# Patient Record
Sex: MALE | Race: BLACK OR AFRICAN AMERICAN | NOT HISPANIC OR LATINO | Employment: STUDENT | URBAN - METROPOLITAN AREA
[De-identification: names, ages, dates, MRNs, and addresses within clinical notes are randomized per-mention and may not be internally consistent; named-entity substitution may affect disease eponyms.]

---

## 2018-02-15 ENCOUNTER — HOSPITAL ENCOUNTER (EMERGENCY)
Facility: OTHER | Age: 18
Discharge: HOME | End: 2018-02-15
Attending: EMERGENCY MEDICINE
Payer: MEDICAID

## 2018-02-15 VITALS
SYSTOLIC BLOOD PRESSURE: 117 MMHG | HEART RATE: 66 BPM | OXYGEN SATURATION: 100 % | BODY MASS INDEX: 21.53 KG/M2 | HEIGHT: 66 IN | RESPIRATION RATE: 18 BRPM | TEMPERATURE: 98 F | WEIGHT: 134 LBS | DIASTOLIC BLOOD PRESSURE: 69 MMHG

## 2018-02-15 DIAGNOSIS — K64.9 HEMORRHOIDS, UNSPECIFIED HEMORRHOID TYPE: Primary | ICD-10-CM

## 2018-02-15 PROCEDURE — 99283 EMERGENCY DEPT VISIT LOW MDM: CPT

## 2018-02-15 RX ORDER — DOCUSATE SODIUM 100 MG/1
100 CAPSULE, LIQUID FILLED ORAL 2 TIMES DAILY
Qty: 60 CAPSULE | Refills: 0 | Status: SHIPPED | OUTPATIENT
Start: 2018-02-15

## 2018-02-15 RX ORDER — HYDROCORTISONE 1 %
CREAM (GRAM) TOPICAL
Qty: 30 G | Refills: 0 | Status: SHIPPED | OUTPATIENT
Start: 2018-02-15 | End: 2018-02-25

## 2018-02-15 NOTE — ED PROVIDER NOTES
"Encounter Date: 2/15/2018       History     Chief Complaint   Patient presents with    Hemorrhoids     has been having hemorrhoids for 6 months and they are bleeding and is stating that he also had a headache     Patient is a 17 y.o. male presenting to the emergency department with complaints of hemorrhoids.  He states that he's had this problem for "many years", but has not seen anyone for it.  He states that he has rectal bleeding with bowel movements, but denies any significant pain.  He denies any difficulty with bowel movements, stating his last was yesterday.  He also reports discomfort at times.  He states he has not tried any over-the-counter medication, as he was not "referred to take any". He denies seeking previous medical care for this. He denies other complaints.       The history is provided by the patient.     Review of patient's allergies indicates:  No Known Allergies  History reviewed. No pertinent past medical history.  History reviewed. No pertinent surgical history.  History reviewed. No pertinent family history.  Social History   Substance Use Topics    Smoking status: Never Smoker    Smokeless tobacco: Never Used    Alcohol use No     Review of Systems   Constitutional: Negative for activity change, chills, fatigue and fever.   HENT: Negative for congestion, rhinorrhea and sore throat.    Eyes: Negative for photophobia and visual disturbance.   Respiratory: Negative for cough and shortness of breath.    Cardiovascular: Negative for chest pain.   Gastrointestinal: Positive for anal bleeding. Negative for abdominal pain, diarrhea, nausea and vomiting.        Rectal discomfort   Genitourinary: Negative for dysuria, hematuria and urgency.   Musculoskeletal: Negative for back pain, myalgias and neck pain.   Skin: Negative for color change and wound.   Neurological: Negative for weakness and headaches.   Psychiatric/Behavioral: Negative for agitation and confusion.       Physical Exam "     Initial Vitals [02/15/18 0852]   BP Pulse Resp Temp SpO2   113/75 72 18 98.4 °F (36.9 °C) 97 %      MAP       87.67         Physical Exam    Nursing note and vitals reviewed.  Constitutional: Vital signs are normal. He appears well-developed and well-nourished. He is not diaphoretic.  Non-toxic appearance. He does not have a sickly appearance. He does not appear ill. No distress.   Well appearing, -American male unaccompanied in the emergency department.  Speaking clear and full sentences.  No acute distress.   HENT:   Head: Normocephalic and atraumatic.   Right Ear: External ear normal.   Left Ear: External ear normal.   Nose: Nose normal.   Mouth/Throat: Oropharynx is clear and moist.   Eyes: Conjunctivae and EOM are normal.   Neck: Normal range of motion. Neck supple.   Cardiovascular: Normal rate, regular rhythm and normal heart sounds.   Pulmonary/Chest: Breath sounds normal. No respiratory distress. He has no wheezes.   Genitourinary: Rectal exam shows no fissure and no mass.   Genitourinary Comments: Non thrombosed, non bleeding hemorrhoid noted with no tenderness to palpation.    Musculoskeletal: Normal range of motion.   Neurological: He is alert and oriented to person, place, and time. GCS eye subscore is 4. GCS verbal subscore is 5. GCS motor subscore is 6.   Skin: Skin is warm.   Psychiatric: He has a normal mood and affect. His behavior is normal. Judgment and thought content normal.         ED Course   Procedures  Labs Reviewed - No data to display          Medical Decision Making:   Initial Assessment:   Urgent evaluation of a 17-year-old male presenting with complaints of hemorrhoids.  Patient is afebrile, nontoxic appearing, hemodynamically stable.  Physical exam reveals regular rate and rhythm, lungs are clear to auscultation bilaterally.  Rectal exam reveals evidence of a nonthrombosed, nonbleeding hemorrhoid.  No surrounding tenderness or obvious fissure.  ED Management:  At this time,  no further testing or imaging is warranted.  Patient does not have any signs or symptoms concerning for an acute process.  I had a lengthy discussion with the patient in regards to conservative symptomatic care and management.  Recommended he start a stool softener as well as use hydrocortisone cream.  Provided with prescriptions for both.  The patient stated that he was hoping to have surgery immediately within the emergency department.  I explained to the patient this was not possible, and instead recommended he follow-up with his PCP for further evaluation and management of this, possible referral to colorectal surgery.  Stable for discharge home. The patient was instructed to follow up with a primary care provider in 2 days or to return to the emergency department for worsening symptoms. The treatment plan was discussed with the patient who demonstrated understanding and comfort with plan. The patient's history, physical exam, and plan of care was discussed with and agreed upon with my supervising physician.    Other:   I have discussed this case with another health care provider.       <> Summary of the Discussion: Dr. Engel  This note was created using Dragon Medical Dictation. There may be typographical errors secondary to dictation.                    ED Course      Clinical Impression:     1. Hemorrhoids, unspecified hemorrhoid type       Disposition:   Disposition: Discharged  Condition: Stable                        Chantell Pacheco PA-C  02/15/18 1129

## 2018-02-15 NOTE — ED TRIAGE NOTES
"Pt reports to ED c/o external hemorrhoids x months with intermittent bleeding. Pt denies taking any OTC medications, "cherelle never been treated for it". Denies constipation, abd pain. Pt unsure what causes bleeding to hemorrhoids. Denies chest pain, SOB, fatigue, N/V/D.   "